# Patient Record
Sex: MALE | Race: WHITE | NOT HISPANIC OR LATINO | URBAN - METROPOLITAN AREA
[De-identification: names, ages, dates, MRNs, and addresses within clinical notes are randomized per-mention and may not be internally consistent; named-entity substitution may affect disease eponyms.]

---

## 2020-10-31 ENCOUNTER — EMERGENCY (EMERGENCY)
Facility: HOSPITAL | Age: 40
LOS: 0 days | Discharge: HOME | End: 2020-10-31
Attending: EMERGENCY MEDICINE
Payer: COMMERCIAL

## 2020-10-31 VITALS — WEIGHT: 220.02 LBS | HEIGHT: 72 IN

## 2020-10-31 VITALS
OXYGEN SATURATION: 99 % | RESPIRATION RATE: 18 BRPM | SYSTOLIC BLOOD PRESSURE: 122 MMHG | HEART RATE: 60 BPM | TEMPERATURE: 98 F | DIASTOLIC BLOOD PRESSURE: 61 MMHG

## 2020-10-31 DIAGNOSIS — S52.601B: ICD-10-CM
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
## 2020-10-31 DIAGNOSIS — Z23 ENCOUNTER FOR IMMUNIZATION: ICD-10-CM

## 2020-10-31 DIAGNOSIS — M25.531 PAIN IN RIGHT WRIST: ICD-10-CM
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
## 2020-10-31 PROCEDURE — 90471 IMMUNIZATION ADMIN: CPT

## 2020-10-31 PROCEDURE — 73090 X-RAY EXAM OF FOREARM: CPT | Mod: RT

## 2020-10-31 PROCEDURE — 90715 TDAP VACCINE 7 YRS/> IM: CPT

## 2020-10-31 PROCEDURE — 73110 X-RAY EXAM OF WRIST: CPT | Mod: 26,RT

## 2020-10-31 PROCEDURE — 29125 APPL SHORT ARM SPLINT STATIC: CPT | Mod: RT

## 2020-10-31 PROCEDURE — 96365 THER/PROPH/DIAG IV INF INIT: CPT | Mod: XU

## 2020-10-31 PROCEDURE — 73090 X-RAY EXAM OF FOREARM: CPT | Mod: 26,RT

## 2020-10-31 PROCEDURE — 99284 EMERGENCY DEPT VISIT MOD MDM: CPT | Mod: 25

## 2020-10-31 PROCEDURE — 99284 EMERGENCY DEPT VISIT MOD MDM: CPT

## 2020-10-31 PROCEDURE — 73100 X-RAY EXAM OF WRIST: CPT | Mod: 26,59,RT

## 2020-10-31 PROCEDURE — 73110 X-RAY EXAM OF WRIST: CPT | Mod: RT

## 2020-10-31 PROCEDURE — 96375 TX/PRO/DX INJ NEW DRUG ADDON: CPT | Mod: XU

## 2020-10-31 RX ORDER — CEPHALEXIN 500 MG
1 CAPSULE ORAL
Qty: 28 | Refills: 0
Start: 2020-10-31

## 2020-10-31 RX ORDER — TETANUS TOXOID, REDUCED DIPHTHERIA TOXOID AND ACELLULAR PERTUSSIS VACCINE, ADSORBED 5; 2.5; 8; 8; 2.5 [IU]/.5ML; [IU]/.5ML; UG/.5ML; UG/.5ML; UG/.5ML
0.5 SUSPENSION INTRAMUSCULAR ONCE
Refills: 0 | Status: COMPLETED | OUTPATIENT
Start: 2020-10-31 | End: 2020-10-31

## 2020-10-31 RX ORDER — MORPHINE SULFATE 50 MG/1
4 CAPSULE, EXTENDED RELEASE ORAL ONCE
Refills: 0 | Status: DISCONTINUED | OUTPATIENT
Start: 2020-10-31 | End: 2020-10-31

## 2020-10-31 RX ORDER — CEFAZOLIN SODIUM 1 G
2000 VIAL (EA) INJECTION ONCE
Refills: 0 | Status: COMPLETED | OUTPATIENT
Start: 2020-10-31 | End: 2020-10-31

## 2020-10-31 RX ADMIN — MORPHINE SULFATE 4 MILLIGRAM(S): 50 CAPSULE, EXTENDED RELEASE ORAL at 17:39

## 2020-10-31 RX ADMIN — TETANUS TOXOID, REDUCED DIPHTHERIA TOXOID AND ACELLULAR PERTUSSIS VACCINE, ADSORBED 0.5 MILLILITER(S): 5; 2.5; 8; 8; 2.5 SUSPENSION INTRAMUSCULAR at 16:52

## 2020-10-31 RX ADMIN — Medication 100 MILLIGRAM(S): at 16:52

## 2020-10-31 RX ADMIN — Medication 2000 MILLIGRAM(S): at 17:31

## 2020-10-31 RX ADMIN — MORPHINE SULFATE 4 MILLIGRAM(S): 50 CAPSULE, EXTENDED RELEASE ORAL at 18:05

## 2020-10-31 NOTE — ED STATDOCS - OBJECTIVE STATEMENT
41 y/o M with PMHx of anxiety presents to the ED c/o +RUE pain with +laceration s/p being kicked by his horse. No fever. Not on AC. Last Tetanus unknown. NKDA. PCP: Dr. Jackson.

## 2020-10-31 NOTE — ED STATDOCS - CARE PROVIDERS DIRECT ADDRESSES
,[email or web address] ,[email or web address]

## 2020-10-31 NOTE — ED STATDOCS - PROVIDER TOKENS
PROVIDER:[TOKEN:[[id number]],FOLLOWUP:[4-6 Days]] PROVIDER:[TOKEN:[[id number]],FOLLOWUP:[4-6 Days]]

## 2020-10-31 NOTE — ED ADULT TRIAGE NOTE - CHIEF COMPLAINT QUOTE
Pt. to the ED C/O Right Arm injury when got kicked by horses. Pt. denies injuries to chest, abdomen, head and pelvis- Small laceration to right arm noted. able to move arm- GSC 15- Denies blood thinners/aspirin- Hx. of Anxiety - Pt. does not meet criteria for TA at is time

## 2020-10-31 NOTE — ED STATDOCS - CARE PROVIDER_API CALL
Tyler Mccrary  ORTHOPAEDIC SURGERY  [address], [address]  [place] [address]  Phone: [phone]  Fax: [phone]  Follow Up Time: 4-6 Days   Michele Silva  ORTHOPAEDIC SURGERY  [address]  [place] [address]  Phone: [phone]  Fax: [phone]  Follow Up Time: 4-6 Days

## 2020-10-31 NOTE — ED STATDOCS - PHYSICAL EXAMINATION
PA NOTE: GEN: AOX3, NAD. HEENT: Throat clear. Airway is patent. EYES: PERRLA. EOMI. Head: NC/AT. NECK: Supple, No JVD. FROM. C-spine non-tender. CV:S1S2, RRR, LUNGS: Non-labored breathing, no tachypnea. O2sat 100% RA. CTA b/l. No w/r/r. CHEST: Equal chest expansion and rise. No deformity. ABD: Soft, NT/ND, no rebound, no guarding. No CVAT. EXT: No e/c/c. 2+ distal pulses. RUE: +Deformity right wrist. +Small puncture wound right wrist ulnar aspect, ?suspicious for open fracture. +Slight bleeding. NVI. 2+ distal pulses. +Tenderness. Painful ROM. Right hand exam WNL. NVI. SKIN: No rashes. NEURO: No focal deficits. CN II-XII intact. FROM. 5/5 motor and sensory.  ~Yair Teresa PA-C

## 2020-10-31 NOTE — ED ADULT NURSE NOTE - OBJECTIVE STATEMENT
Patient presents to the ED c/o +RUE pain with +laceration s/p being kicked by his horse. No fever. Not on AC. Last Tetanus unknown.

## 2020-10-31 NOTE — ED ADULT NURSE NOTE - NSIMPLEMENTINTERV_GEN_ALL_ED
Implemented All Universal Safety Interventions:  [place] to call system. Call bell, personal items and telephone within reach. Instruct patient to call for assistance. Room bathroom lighting operational. Non-slip footwear when patient is off stretcher. Physically safe environment: no spills, clutter or unnecessary equipment. Stretcher in lowest position, wheels locked, appropriate side rails in place.

## 2020-10-31 NOTE — ED STATDOCS - PATIENT PORTAL LINK FT
You can access the FollowMyHealth Patient Portal offered by [facility] by registering at the following website: [email or web address]. By joining [facility]’s FollowMyHealth portal, you will also be able to view your health information using other applications (apps) compatible with our system.

## 2020-10-31 NOTE — ED STATDOCS - PROGRESS NOTE DETAILS
PA: Patient is a 41 y/o male with PMHx of Anxiety who presents to [facility] c/o right wrist injury after getting kicked by a horse just PTA. Patient reports right wrist laceration in the area of injury. No other trauma. No head/neck injury. Last TDAP unknown. No fever. Not on anticoagulants. ~Yair Teresa PA-C   Patient seen and evaluated in . Will get xrays, IV Ancef, TDAP. Reassess. ~Yair Teresa PA-C PA: Xrays +devante for right wrist ulnar fracture with puncture wound to injured area, concerning for open fracture. Paged ORTHO Dr. Silva. ~Yair Teresa PA-C PA: Patient seen by Dr. Silva, fracture reduced by ORTHO team & Dr. Silva. Ortho ordered post reduction views. POSSIBLE Open fracture discussed with Dr. Silva who does not think patient needs to go to OR, will see patient in office next week, ok to [place] home. ~SIMÓN RothmanC PA note: Patient seen by Dr. Levy, s/o closed reduction, received Ancef 2gm, Morphine for pain. Wound care performed by ORTHO team. All x-ray studies reviewed in details with patient. Patient re-examined and re-evaluated. Patient feels much better at this time. ED evaluation, Diagnosis and management discussed with the patient in detail. Workup results discussed with ED attending, OK to dc home. Close ORTHO DR. LEVY follow up encouraged.  Strict ED return instructions discussed in detail and patient given the opportunity to ask any questions about their discharge diagnosis and instructions. Patient verbalized understanding. ~ Yair Teresa PA-C

## 2020-10-31 NOTE — ED STATDOCS - NS_ ATTENDINGSCRIBEDETAILS _ED_A_ED_FT
I, Cleve Zhou MD,  performed the initial face to face bedside interview with this patient regarding history of present illness, review of symptoms and relevant past medical, social and family history.  I completed an independent physical examination.    The history, relevant review of systems, past medical and surgical history, medical decision making, and physical examination was documented by the scribe in my presence and I attest to the accuracy of the documentation.

## 2020-10-31 NOTE — ED STATDOCS - NSFOLLOWUPINSTRUCTIONS_ED_ALL_ED_FT
Ulnar Fracture       An ulnar fracture is a break in the ulnar bone. The ulna is a bone in the forearm, on the same side as the little finger. The forearm is the part of the arm that is between the elbow and the wrist. It is made up of two bones: the radius and the ulna. You can feel the ulna on the outside of the wrist and at the point of the elbow.    An ulnar fracture can happen near the wrist, near the elbow, or in the middle of the forearm. In many cases of ulnar fracture, the radius is also fractured.      What are the causes?  This condition is usually caused by a direct hit or stress to the forearm. This may result from:  •An accident, such as a car or bike accident.      •Falling with the arm outstretched.        What increases the risk?  You may be more likely to fracture your ulna if you:  •Play contact sports.      •Have a condition that causes your bones to become thin and brittle (osteoporosis).        What are the signs or symptoms?  Signs and symptoms may include:  •Pain immediately after the injury.      •An abnormal bend or bump in the arm (deformity).      •Swelling.      •Bruising.      •Numbness or weakness in your hand.      •Inability to turn your hand from side to side.        How is this diagnosed?  This condition may be diagnosed based on:  •Your symptoms and medical history.      •A physical exam.      •An X-ray.        How is this treated?  Treatment depends on how severe your fracture is, where it is, and how the pieces of the broken bone line up with each other (alignment).  •The first step in treatment may be for you to wear a temporary splint for a few days. After the swelling goes down, you may get a cast, get a different type of splint, or have surgery.      •If your broken bone is in good alignment, you will need to wear a splint or cast for several weeks.    •If your broken bone is not aligned (is displaced), your health care provider will need to align the bone pieces. After alignment, you will need to wear a splint or cast for up to 6 weeks. To align your broken bone, your health care provider may:  •Move the bones back into position without surgery (closed reduction).      •Perform surgery to align the fracture and fix the bone pieces into place with metal screws, plates, or wires (open reduction and internal fixation, ORIF).      •Perform surgery to align the fracture and fix the bone pieces into place with pins that are attached to a stabilizing bar outside your skin (external fixation).      Treatment may also include:  •Having your cast changed after 2–3 weeks.      •Physical therapy.      •Follow-up visits and X-rays to make sure you are healing.        Follow these instructions at home:    If you have a splint:     •Wear it as told by your health care provider. Remove it only as told by your health care provider.      •Loosen the splint if your fingers tingle, become numb, or turn cold and blue.      •Keep the splint clean and dry.      If you have a cast:     • Do not stick anything inside the cast to scratch your skin. Doing that increases your risk for infection.      •Check the skin around the cast every day. Tell your health care provider about any concerns.      •You may put lotion on dry skin around the edges of the cast. Do not put lotion on the skin underneath the cast.      •Keep the cast clean and dry.      Bathing     • Do not take baths, swim, or use a hot tub until your health care provider approves. Ask your health care provider if you may take showers. You may only be allowed to take sponge baths.    •If your splint or cast is not waterproof:  •Do not let it get wet.      •Cover it with a watertight covering when you take a bath or a shower.        Activity     • Do not lift anything with your injured arm.      • Do not use the injured arm to support your body weight until your health care provider says that you can.      •Ask your health care provider what activities are safe for you during recovery, and ask what activities you need to avoid.        Managing pain, stiffness, and swelling    •If directed, put ice on painful areas:  •If you have a removable splint, remove it as told by your health care provider.      •Put ice in a plastic bag.      •Place a towel between your skin and the bag, or between your cast and the bag.      •Leave the ice on for 20 minutes, 2–3 times a day.        •Move your fingers often to avoid stiffness and to lessen swelling.      •Raise (elevate) the injured area above the level of your heart while you are sitting or lying down.      General instructions     • Do not put pressure on any part of the cast or splint until it is fully hardened. This may take several hours.      •Take over-the-counter and prescription medicines only as told by your health care provider.      • Do not drive until your health care provider approves. You should not drive or use heavy machinery while taking prescription pain medicine.      • Do not use any products that contain nicotine or tobacco, such as cigarettes and e-cigarettes. These can delay bone healing. If you need help quitting, ask your health care provider.       •Keep all follow-up visits as told by your health care provider. This is important.        Contact a health care provider if you have:    •Pain that does not get better with medicine.      •Swelling that gets worse.      •A bad smell coming from your cast.        Get help right away if:    •You cannot move your fingers.      •You have severe pain.    •Your fingers or your hand:  •Become numb, cold, or pale.      •Turn a bluish color.          Summary    •An ulnar fracture is a break in the ulnar bone, which is the bone in your forearm that is on the same side as your little finger.      •You may need to wear a splint or a cast for at least several weeks. Sometimes surgery is needed.      •Keep all follow-up visits as told by your health care provider.      This information is not intended to replace advice given to you by your health care provider. Make sure you discuss any questions you have with your health care provider.

## 2020-10-31 NOTE — CONSULT NOTE ADULT - SUBJECTIVE AND OBJECTIVE BOX
40yMale presents to  ED c/o severe R wrist pain s/p mechanical fall. Pt states he was kicked by his horse in his right wrist this afternoon. Patient denies head hit or LOC. Localizing pain to distal radius. Denies radiation of pain. Pt denies numbness, tingling or burning. R Hand Dominant. Patient denies any other injuries.    PMH: denies    AH: denies      T(C): 36.7 (10-31-20 @ 18:45)  HR: 60 (10-31-20 @ 18:45)  BP: 122/61 (10-31-20 @ 18:45)  RR: 18 (10-31-20 @ 18:45)  SpO2: 99% (10-31-20 @ 18:45)  Wt(kg): --    PE R UE:  less than 1 cm poke hole at lateral distal ulnar border, no visible deformity of wrist, + soft tissue swelling, no ecchymosis; Decreased ROM of Wrist 2/2 pain. Normal Elbow/Shoulder ROM w/o pain. + TTP over Ulna. + Rad Pulse 2+/4. SILT C5-T1, +AIN/PIN/Ulnar/Radial/Musc/Median, soft compartments;    Secondary Exam: Benign, Skin intact, NTTP along axial spine, SILT throughout, motor grossly intact throughout, no other orthopedic injuries at this time, compartments soft and compressible      Imaging:  XRay R Wrist  3 views of R Wrist demonstrates R distal 3rd ulna fracture, transverse, extra-articular w/o displacement of carpus/hand in relation to forearm. No other fx/dislocations noted.         Procedure Note:   Poke hole irrigated and covered with sterile gauze. Sugartong splint applied to Forearm/Wrist and mold held. NV XR obtained which show improved alignment. Pt NVI post procedure. Pt tolerated procedure well.    A/P: 40yMale s/p Mech Fall w/ open  R Distal 3rd ulna Fracture    - Pain control  - abx-Keflex 500mg qid for 10 days  - Strict Ice/Elevation  - NWB R UE with splint and sling  - Keep splint clean/dry/intact;  - Encourage active finger motion to help with swelling  - Pt aware of possible need for surgical intervention of distal radius fracture. Will FU as outpatient with Dr. Silva Next week  - Pt made aware of signs and symptoms of compartment syndrome. Aware of need to contact Doctor / Return to ED if symptoms arise.  - All Pt's / Family Members questions answered, Pt/family understand plan.  - ELIZABETH lim/ Dr. Silva in 2-3 days.

## 2020-12-11 PROBLEM — F41.9 ANXIETY DISORDER, UNSPECIFIED: Chronic | Status: ACTIVE | Noted: 2020-10-31

## 2020-12-15 PROBLEM — Z00.00 ENCOUNTER FOR PREVENTIVE HEALTH EXAMINATION: Status: ACTIVE | Noted: 2020-12-15

## 2020-12-16 ENCOUNTER — APPOINTMENT (OUTPATIENT)
Dept: ORTHOPEDIC SURGERY | Facility: CLINIC | Age: 40
End: 2020-12-16
Payer: COMMERCIAL

## 2020-12-16 VITALS — BODY MASS INDEX: 30.48 KG/M2 | HEIGHT: 72 IN | WEIGHT: 225 LBS

## 2020-12-16 DIAGNOSIS — Z78.9 OTHER SPECIFIED HEALTH STATUS: ICD-10-CM

## 2020-12-16 DIAGNOSIS — Z80.9 FAMILY HISTORY OF MALIGNANT NEOPLASM, UNSPECIFIED: ICD-10-CM

## 2020-12-16 DIAGNOSIS — G40.909 EPILEPSY, UNSPECIFIED, NOT INTRACTABLE, W/OUT STATUS EPILEPTICUS: ICD-10-CM

## 2020-12-16 PROCEDURE — 73110 X-RAY EXAM OF WRIST: CPT | Mod: RT

## 2020-12-16 PROCEDURE — 99072 ADDL SUPL MATRL&STAF TM PHE: CPT

## 2020-12-16 PROCEDURE — 99203 OFFICE O/P NEW LOW 30 MIN: CPT

## 2020-12-16 RX ORDER — ESCITALOPRAM OXALATE 20 MG/1
20 TABLET ORAL
Refills: 0 | Status: ACTIVE | COMMUNITY

## 2020-12-16 RX ORDER — VALACYCLOVIR 500 MG/1
TABLET, FILM COATED ORAL
Refills: 0 | Status: ACTIVE | COMMUNITY

## 2020-12-17 ENCOUNTER — OUTPATIENT (OUTPATIENT)
Dept: OUTPATIENT SERVICES | Facility: HOSPITAL | Age: 40
LOS: 1 days | End: 2020-12-17
Payer: COMMERCIAL

## 2020-12-17 VITALS
SYSTOLIC BLOOD PRESSURE: 123 MMHG | DIASTOLIC BLOOD PRESSURE: 81 MMHG | WEIGHT: 225.09 LBS | TEMPERATURE: 98 F | HEIGHT: 72 IN | HEART RATE: 62 BPM | OXYGEN SATURATION: 97 % | RESPIRATION RATE: 14 BRPM

## 2020-12-17 DIAGNOSIS — Z98.890 OTHER SPECIFIED POSTPROCEDURAL STATES: Chronic | ICD-10-CM

## 2020-12-17 DIAGNOSIS — S52.201A UNSPECIFIED FRACTURE OF SHAFT OF RIGHT ULNA, INITIAL ENCOUNTER FOR CLOSED FRACTURE: ICD-10-CM

## 2020-12-17 DIAGNOSIS — Z01.818 ENCOUNTER FOR OTHER PREPROCEDURAL EXAMINATION: ICD-10-CM

## 2020-12-17 NOTE — H&P PST ADULT - ADDITIONAL PE
physical exam today at bedside only  history taken at another date by another provider  Monica, NP-C

## 2020-12-17 NOTE — H&P PST ADULT - NSICDXPASTMEDICALHX_GEN_ALL_CORE_FT
PAST MEDICAL HISTORY:  Anxiety     Fracture right distal ulna and radius    H/O herpes zoster      PAST MEDICAL HISTORY:  Anxiety     Fracture right distal ulna and radius    H/O herpes zoster     History of seizures 15 years ago on time episode

## 2020-12-17 NOTE — H&P PST ADULT - ASSESSMENT
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

## 2020-12-17 NOTE — H&P PST ADULT - HISTORY OF PRESENT ILLNESS
39 y/o male with h/o injury to right distal radius , has soft cast placed and removed with non union of bones scheduled for ORIF

## 2020-12-17 NOTE — H&P PST ADULT - NSANTHOSAYNRD_GEN_A_CORE
No. HALI screening performed.  STOP BANG Legend: 0-2 = LOW Risk; 3-4 = INTERMEDIATE Risk; 5-8 = HIGH Risk

## 2020-12-19 DIAGNOSIS — Z20.828 CONTACT WITH AND (SUSPECTED) EXPOSURE TO OTHER VIRAL COMMUNICABLE DISEASES: ICD-10-CM

## 2020-12-19 PROCEDURE — G0463: CPT

## 2020-12-20 ENCOUNTER — OUTPATIENT (OUTPATIENT)
Dept: OUTPATIENT SERVICES | Facility: HOSPITAL | Age: 40
LOS: 1 days | End: 2020-12-20
Payer: COMMERCIAL

## 2020-12-20 DIAGNOSIS — Z98.890 OTHER SPECIFIED POSTPROCEDURAL STATES: Chronic | ICD-10-CM

## 2020-12-20 DIAGNOSIS — Z20.828 CONTACT WITH AND (SUSPECTED) EXPOSURE TO OTHER VIRAL COMMUNICABLE DISEASES: ICD-10-CM

## 2020-12-20 LAB — SARS-COV-2 RNA SPEC QL NAA+PROBE: SIGNIFICANT CHANGE UP

## 2020-12-20 PROCEDURE — U0003: CPT

## 2020-12-21 ENCOUNTER — TRANSCRIPTION ENCOUNTER (OUTPATIENT)
Age: 40
End: 2020-12-21

## 2020-12-22 ENCOUNTER — APPOINTMENT (OUTPATIENT)
Dept: ORTHOPEDIC SURGERY | Facility: HOSPITAL | Age: 40
End: 2020-12-22

## 2020-12-22 ENCOUNTER — OUTPATIENT (OUTPATIENT)
Dept: OUTPATIENT SERVICES | Facility: HOSPITAL | Age: 40
LOS: 1 days | End: 2020-12-22
Payer: COMMERCIAL

## 2020-12-22 ENCOUNTER — RESULT REVIEW (OUTPATIENT)
Age: 40
End: 2020-12-22

## 2020-12-22 VITALS
HEART RATE: 66 BPM | DIASTOLIC BLOOD PRESSURE: 60 MMHG | OXYGEN SATURATION: 98 % | RESPIRATION RATE: 16 BRPM | SYSTOLIC BLOOD PRESSURE: 106 MMHG

## 2020-12-22 VITALS
RESPIRATION RATE: 14 BRPM | TEMPERATURE: 98 F | HEIGHT: 69 IN | HEART RATE: 62 BPM | DIASTOLIC BLOOD PRESSURE: 81 MMHG | SYSTOLIC BLOOD PRESSURE: 123 MMHG | OXYGEN SATURATION: 97 % | WEIGHT: 220.9 LBS

## 2020-12-22 DIAGNOSIS — S52.201A UNSPECIFIED FRACTURE OF SHAFT OF RIGHT ULNA, INITIAL ENCOUNTER FOR CLOSED FRACTURE: ICD-10-CM

## 2020-12-22 DIAGNOSIS — Z98.890 OTHER SPECIFIED POSTPROCEDURAL STATES: Chronic | ICD-10-CM

## 2020-12-22 PROCEDURE — C1889: CPT

## 2020-12-22 PROCEDURE — 25400 REPAIR RADIUS OR ULNA: CPT | Mod: RT

## 2020-12-22 PROCEDURE — 25405 REPAIR/GRAFT RADIUS OR ULNA: CPT | Mod: RT

## 2020-12-22 PROCEDURE — C1713: CPT

## 2020-12-22 PROCEDURE — 76000 FLUOROSCOPY <1 HR PHYS/QHP: CPT

## 2020-12-22 RX ORDER — ACETAMINOPHEN 500 MG
1000 TABLET ORAL ONCE
Refills: 0 | Status: COMPLETED | OUTPATIENT
Start: 2020-12-22 | End: 2020-12-22

## 2020-12-22 RX ORDER — VALACYCLOVIR 500 MG/1
0 TABLET, FILM COATED ORAL
Qty: 0 | Refills: 2 | DISCHARGE

## 2020-12-22 RX ORDER — ESCITALOPRAM OXALATE 10 MG/1
0 TABLET, FILM COATED ORAL
Qty: 0 | Refills: 1 | DISCHARGE

## 2020-12-22 RX ORDER — CEFAZOLIN SODIUM 1 G
2000 VIAL (EA) INJECTION ONCE
Refills: 0 | Status: DISCONTINUED | OUTPATIENT
Start: 2020-12-22 | End: 2020-12-22

## 2020-12-22 RX ORDER — ONDANSETRON 8 MG/1
4 TABLET, FILM COATED ORAL ONCE
Refills: 0 | Status: DISCONTINUED | OUTPATIENT
Start: 2020-12-22 | End: 2020-12-22

## 2020-12-22 RX ORDER — SODIUM CHLORIDE 9 MG/ML
1000 INJECTION, SOLUTION INTRAVENOUS
Refills: 0 | Status: DISCONTINUED | OUTPATIENT
Start: 2020-12-22 | End: 2020-12-22

## 2020-12-22 RX ORDER — HYDROMORPHONE HYDROCHLORIDE 2 MG/ML
0.5 INJECTION INTRAMUSCULAR; INTRAVENOUS; SUBCUTANEOUS
Refills: 0 | Status: DISCONTINUED | OUTPATIENT
Start: 2020-12-22 | End: 2020-12-22

## 2020-12-22 RX ORDER — OXYCODONE HYDROCHLORIDE 5 MG/1
1 TABLET ORAL
Qty: 10 | Refills: 0
Start: 2020-12-22

## 2020-12-22 RX ORDER — CHLORHEXIDINE GLUCONATE 213 G/1000ML
1 SOLUTION TOPICAL ONCE
Refills: 0 | Status: COMPLETED | OUTPATIENT
Start: 2020-12-22 | End: 2020-12-22

## 2020-12-22 RX ORDER — APREPITANT 80 MG/1
40 CAPSULE ORAL ONCE
Refills: 0 | Status: COMPLETED | OUTPATIENT
Start: 2020-12-22 | End: 2020-12-22

## 2020-12-22 RX ADMIN — Medication 1000 MILLIGRAM(S): at 13:00

## 2020-12-22 RX ADMIN — CHLORHEXIDINE GLUCONATE 1 APPLICATION(S): 213 SOLUTION TOPICAL at 08:41

## 2020-12-22 RX ADMIN — SODIUM CHLORIDE 75 MILLILITER(S): 9 INJECTION, SOLUTION INTRAVENOUS at 12:37

## 2020-12-22 RX ADMIN — APREPITANT 40 MILLIGRAM(S): 80 CAPSULE ORAL at 08:41

## 2020-12-22 RX ADMIN — Medication 400 MILLIGRAM(S): at 12:37

## 2020-12-22 NOTE — PROGRESS NOTE ADULT - ASSESSMENT
41 yo male is scheduled for ORIF right distal umnar possible distal radius fracture today at [facility]

## 2020-12-22 NOTE — ASU DISCHARGE PLAN (ADULT/PEDIATRIC) - "IF YOU OR YOUR GUARDIAN/FAMILY IS A SMOKER, IT IS IMPORTANT FOR YOUR HEALTH TO STOP SMOKING. PLEASE BE AWARE THAT SECOND HAND SMOKE IS ALSO HARMFUL."
Problem: Physical Therapy Goal  Goal: Physical Therapy Goal  Goals to be met by:      Patient will increase functional independence with mobility by performin. Supine to sit with Modified [place] -not met  2. Sit to stand transfer with [place] -not met  3. Bed to chair transfer with independence using no AD -not met  4. Gait  x150 feet with Supervision using LRAD. -not met  5. Lower extremity exercise program x20 reps per handout, with independence -not met       Goals remain appropriate. Nataliya Rushing, PT 2018           Statement Selected

## 2020-12-22 NOTE — PROGRESS NOTE ADULT - SUBJECTIVE AND OBJECTIVE BOX
physical exam today at bedside for surgery  right ulnar fracture with pain and swelling   vital signs stable  last solids 12/21/2020  NKDA

## 2020-12-22 NOTE — ASU DISCHARGE PLAN (ADULT/PEDIATRIC) - CALL YOUR DOCTOR IF YOU HAVE ANY OF THE FOLLOWING:
Pain not relieved by Medications/Fever greater than (need to indicate Fahrenheit or Celsius) Bleeding that does not stop/Swelling that gets worse/Pain not relieved by Medications/Fever greater than (need to indicate Fahrenheit or Celsius)/Wound/Surgical Site with redness, or foul smelling discharge or pus/Numbness, tingling, color or temperature change to extremity

## 2020-12-22 NOTE — BRIEF OPERATIVE NOTE - NSICDXBRIEFPROCEDURE_GEN_ALL_CORE_FT
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

## 2020-12-24 ENCOUNTER — APPOINTMENT (OUTPATIENT)
Dept: ORTHOPEDIC SURGERY | Facility: CLINIC | Age: 40
End: 2020-12-24
Payer: COMMERCIAL

## 2020-12-24 VITALS — TEMPERATURE: 97.6 F

## 2020-12-24 PROBLEM — Z86.19 PERSONAL HISTORY OF OTHER INFECTIOUS AND PARASITIC DISEASES: Chronic | Status: ACTIVE | Noted: 2020-12-17

## 2020-12-24 PROBLEM — Z87.898 PERSONAL HISTORY OF OTHER SPECIFIED CONDITIONS: Chronic | Status: ACTIVE | Noted: 2020-12-17

## 2020-12-24 PROBLEM — T14.8XXA OTHER INJURY OF UNSPECIFIED BODY REGION, INITIAL ENCOUNTER: Chronic | Status: ACTIVE | Noted: 2020-12-17

## 2020-12-24 PROCEDURE — 73110 X-RAY EXAM OF WRIST: CPT | Mod: RT

## 2020-12-24 PROCEDURE — 29075 APPL CST ELBW FNGR SHORT ARM: CPT | Mod: 58,RT

## 2020-12-24 PROCEDURE — 99024 POSTOP FOLLOW-UP VISIT: CPT

## 2020-12-25 ENCOUNTER — TRANSCRIPTION ENCOUNTER (OUTPATIENT)
Age: 40
End: 2020-12-25

## 2021-01-22 ENCOUNTER — APPOINTMENT (OUTPATIENT)
Dept: ORTHOPEDIC SURGERY | Facility: CLINIC | Age: 41
End: 2021-01-22
Payer: COMMERCIAL

## 2021-01-22 PROCEDURE — 99024 POSTOP FOLLOW-UP VISIT: CPT

## 2021-01-22 PROCEDURE — 73110 X-RAY EXAM OF WRIST: CPT | Mod: RT

## 2021-01-22 NOTE — END OF VISIT
[FreeTextEntry3] : This note was written by Trinidad George on 01/22/2021 acting solely as a scribe for Dr. Luis Kapoor.\par  \par All medical record entries made by the Scribe were at my, Dr. Luis Kapoor, direction and personally dictated by me on 01/22/2021. I have personally reviewed the chart and agree that the record accurately reflects my personal performance of the history, physical exam, assessment and plan.

## 2021-01-22 NOTE — ADDENDUM
[FreeTextEntry1] : I, Trinidad George, acted solely as a scribe for Dr. Kapoor on this date 01/22/2021.

## 2021-01-22 NOTE — DISCUSSION/SUMMARY
[FreeTextEntry1] : He has findings consistent with a 6-1/2-week old right distal ulna fracture that was reduced, and is now angulated and has not healed.\par \par I had a discussion regarding today's visit, the diagnosis, and treatment recommendations / options. At this time, based upon his x-rays, I told him that I would recommend ORIF.  I agree with the opinion the other hand surgeon that he saw.  He agreed to proceed and told me he would like to me to perform his surgery. He will be scheduled for surgery next Tuesday.  I have recommended ORIF of the fracture.  If necessary, he understands that he may require a bone graft.  Most often I use a synthetic graft but I did discuss the possible need for distal radius bone graft.\par \par -  The nature and purposes of the operation/procedure was discussed in detail.  I discussed the surgical procedure in detail, as well as expected postoperative recovery and outcome.\par -  Possible risks, benefits, and complications (from known and unknown causes) of the procedure were discussed in detail.  \par -  Possible non-operative alternatives to the proposed treatment were discussed in detail.  \par -  He was told that possible risks/complications include, but are not limited to:  Infection, nerve or vessel injury, possible need for removal of hardware in the future, possible loss of fixation of the hardware, stiffness, painful scar, poor outcome, need for additional surgical procedures, and other unforeseen complications.  \par -  In addition, the possibility of an ""unsuccessful outcome,"" despite ""successful surgery,"" was discussed with him.  Specifically, I discussed the potential for nonunion, and loss of fixation of the hardware, even with successful surgery.\par -  The patient fully understands these risks and wishes to proceed.  \par -  I had a lengthy discussion with the patient regarding today's visit, the diagnosis, and my surgical treatment recommendations.  The patient has agreed to this plan of management and has expressed full understanding.  All questions were fully answered to the patient's satisfaction. \par \par Over 50% of the time spent with the patient was on counseling the patient on the above diagnosis, treatment plan and prognosis.

## 2021-01-22 NOTE — HISTORY OF PRESENT ILLNESS
[de-identified] : 2 days postoperative. [de-identified] : 2 days status post ORIF of right distal one third ulna shaft fracture.\par \par He is doing well.  His pain is improving. [de-identified] : Examination of his right wrist after the splint was removed demonstrates his incision to be clean and dry.  There is swelling.  There is no drainage or evidence of infection.  He has some limitation of terminal flexion and extension of the digits.  He has a little finger PIP joint flexion contracture from a prior injury.  He has mild frank-incisional numbness and mild numbness along the dorsal ulnar sensory nerve branch. [de-identified] : PA, lateral oblique radiographs of his right wrist demonstrate good alignment of his fracture and hardware. [de-identified] : Stable, 2 days postoperative. [de-identified] : The suture ends were cut.  He was placed into a well-padded well molded right short arm fiberglass cast.  He was instructed on elevation, range of motion exercises to the digits and activity modification.  He will follow-up in 4 weeks for x-rays out of plaster.  He will follow-up before then if he is having any problems.\par \par I did tell him that the slight numbness is not unexpected, as the nerve was retracted during the surgery.  The ulnar nerve was fully dissected out and protected throughout the case, including the dorsal ulnar sensory nerve branch.

## 2021-01-22 NOTE — PHYSICAL EXAM
[de-identified] : - Constitutional: This is a male in no obvious distress.  \par - Psych: Patient is alert and oriented to person, place and time.  Patient has a normal mood and affect.\par - Cardiovascular: Normal pulses throughout the upper extremities.  No significant varicosities are noted in the upper extremities. \par - Neuro: Strength and sensation are intact throughout the upper extremities.  Patient has normal coordination.\par - Respiratory:  Patient exhibits no evidence of shortness of breath or difficulty breathing.\par - Skin: No rashes, lesions, or other abnormalities are noted in the upper extremities.\par \par ---\par  \par Examination of his right wrist and forearm after the cast was removed demonstrates swelling and tenderness along the distal one third of the ulna.  He has pain and limitation of flexion and extension more notable pronation supination with voluntary guarding.  He has a little finger PIP joint flexion contracture from a prior injury.  There are no focal neurologic deficits noted distally along the radial, ulnar or median nerve distributions. [de-identified] : PA, lateral, and oblique radiographs of the right wrist demonstrate a distal one third ulnar fracture to be visible.  The fracture has not healed.  There is angulation apex radial.

## 2021-01-22 NOTE — HISTORY OF PRESENT ILLNESS
[de-identified] : 31 days postoperative. [de-identified] : 31 days status post ORIF of right distal one third ulna shaft fracture.\par \par He is doing well. He denies any pain. He has complaints of some numbness in the little finger.  [de-identified] : Examination of his right wrist after the cast was removed demonstrates his incision to be healing well.  There is decreased swelling.  He has full flexion of the digits.  He does have limitation of extension at the little finger PIP joint from a prior injury.  He has slight numbness to light touch along the dorsal ulnar aspect of the hand but intact sensation to pinch as well as normal sensation palmarly along the median and ulnar nerve distributions. [de-identified] : PA, lateral oblique radiographs of his right wrist demonstrate good alignment of his fracture and hardware.  There is excellent healing of the fracture. [de-identified] : Stable, 31 days postoperative. [de-identified] : He was instructed on range of motion exercises to the wrist, protection with the splint and avoidance of lifting or grasping.  He was given a referral to occupational therapy.  He was also instructed on scar massage and desensitization.  He told me he is going to [place] for the month of February, so he will follow-up when he returns in March.  If there are any problems in the interim, he was instructed to call me.  Finally, with regard to the slight numbness along the dorsal ulnar aspect of the hand, this is secondary to retraction of the dorsal ulnar cutaneous nerve branch which was dissected out and protected throughout the procedure.  I told him that this should resolve with time.

## 2021-01-22 NOTE — END OF VISIT
[FreeTextEntry3] : This note was written by Trinidad George on 12/16/2020 acting solely as a scribe for Dr. Luis Kapoor.\par  \par All medical record entries made by the Scribe were at my, Dr. Luis Kapoor, direction and personally dictated by me on 12/16/2020. I have personally reviewed the chart and agree that the record accurately reflects my personal performance of the history, physical exam, assessment and plan.

## 2021-01-22 NOTE — ADDENDUM
[FreeTextEntry1] : I, Trinidad George, acted solely as a scribe for Dr. Kapoor on this date 12/16/2020.

## 2021-01-22 NOTE — HISTORY OF PRESENT ILLNESS
[Right] : right hand dominant [FreeTextEntry1] : He comes in today for evaluation of a right wrist fracture, which occurred 6.5 weeks ago. He went to [facility] at the time where x-rays of the right wrist demonstrated transverse fracture distal ulnar diaphysis. The fracture was reduced and he was fitted with a splint. He followed up with the ER physician, Dr. Silva, a few days later who converted him to a cast. Repeat x-rays demonstrated increasing displacement of his fracture from 10 to 20 degrees. He then saw another hand specialist, Dr. Talley, last week who told him that he needs surgery. He presents today for a second opinion.

## 2021-03-05 PROBLEM — S52.201A FRACTURE OF RIGHT ULNA: Status: ACTIVE | Noted: 2020-12-16

## 2021-03-12 ENCOUNTER — APPOINTMENT (OUTPATIENT)
Dept: ORTHOPEDIC SURGERY | Facility: CLINIC | Age: 41
End: 2021-03-12
Payer: SELF-PAY

## 2021-03-12 DIAGNOSIS — S52.201A UNSPECIFIED FRACTURE OF SHAFT OF RIGHT ULNA, INITIAL ENCOUNTER FOR CLOSED FRACTURE: ICD-10-CM

## 2021-03-12 PROCEDURE — 73110 X-RAY EXAM OF WRIST: CPT | Mod: RT

## 2021-03-12 PROCEDURE — 99024 POSTOP FOLLOW-UP VISIT: CPT

## 2021-03-12 NOTE — END OF VISIT
[FreeTextEntry3] : This note was written by Trinidad George on 03/12/2021 acting solely as a scribe for Dr. Luis Kapoor.\par  \par All medical record entries made by the Scribe were at my, Dr. Luis Kapoor, direction and personally dictated by me on 03/12/2021. I have personally reviewed the chart and agree that the record accurately reflects my personal performance of the history, physical exam, assessment and plan.

## 2021-03-12 NOTE — ADDENDUM
[FreeTextEntry1] : I, Trinidad George, acted solely as a scribe for Dr. Kapoor on this date 03/12/2021.

## 2021-03-12 NOTE — HISTORY OF PRESENT ILLNESS
[de-identified] : 2-1/2 months postoperative. 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  Date of surgery: 12/22/2020.\par \par He was away in [place] for the past month.\par \par He is doing well. His motion is improving. He is having some numbness along the dorsal aspect of the wrist. He has been wearing a wrist brace for comfort. [de-identified] : Examination of his right wrist demonstrates his incision to be healing well.  There is minimal residual swelling.  He has some residual numbness along the dorsal aspect of the incision but intact sensation to light touch along the dorsal ulnar sensory nerve branch within the hand.  He has full flexion and extension of the digits.  He has approximately 60 degrees of wrist flexion and extension at 75 degrees of pronation supination. [de-identified] : PA, lateral oblique radiographs of his right wrist demonstrate good alignment of his fracture and hardware.  The fracture is fully consolidated. [de-identified] : Stable, 2-1/2 months postoperative. [de-identified] : He was instructed on continue range of motion exercises and scar massage and desensitization. He was given a referral to hand therapy.  He will advance his activities, according to his symptoms.  He will follow-up in 6 weeks as needed, according to his symptoms.

## 2021-06-01 ENCOUNTER — OUTPATIENT (OUTPATIENT)
Dept: OUTPATIENT SERVICES | Facility: HOSPITAL | Age: 41
LOS: 1 days | End: 2021-06-01
Payer: COMMERCIAL

## 2021-06-01 DIAGNOSIS — Z20.828 CONTACT WITH AND (SUSPECTED) EXPOSURE TO OTHER VIRAL COMMUNICABLE DISEASES: ICD-10-CM

## 2021-06-01 DIAGNOSIS — Z98.890 OTHER SPECIFIED POSTPROCEDURAL STATES: Chronic | ICD-10-CM

## 2021-06-01 LAB — SARS-COV-2 RNA SPEC QL NAA+PROBE: SIGNIFICANT CHANGE UP

## 2021-06-01 PROCEDURE — U0003: CPT

## 2021-06-01 PROCEDURE — U0005: CPT

## 2021-06-01 PROCEDURE — C9803: CPT

## 2021-06-02 DIAGNOSIS — Z20.828 CONTACT WITH AND (SUSPECTED) EXPOSURE TO OTHER VIRAL COMMUNICABLE DISEASES: ICD-10-CM

## 2022-05-01 NOTE — ASU DISCHARGE PLAN (ADULT/PEDIATRIC) - ACTIVITY LEVEL
Pt examined at bedside requesting pain medication. No weight bearing/Elevate extremity No excercise/No heavy lifting/No weight bearing/Elevate extremity

## 2023-12-26 NOTE — H&P PST ADULT - MEDICATION HERBAL REMEDIES, PROFILE
"[facility]  Progress Note  Name: Neha Molina I  MRN: [id number]  Unit/Bed#: [facility] 617-01 I Date of Admission: 12/21/2023   Date of Service: 12/26/2023 I Hospital Day: 5    Assessment/Plan   Fracture of ramus of right pubis (HCC)  Assessment & Plan  -Comminuted displaced fracture of the right inferior and superior pubic rami.   -See sacral fracture for management    Acute pain due to trauma  Assessment & Plan  -Multimodal pain control    Fall  Assessment & Plan  -Mechanical in nature  -PT/OT  -Geriatrics consult    Sacral fracture, closed (HCC)  Assessment & Plan  -Acute nondisplaced fracture involving the right sacral wing.  -Ortho consulted, appreciate recommendations  -Ortho recommends outpatient follow-up in 4 weeks   -PT/OT  -WBAT  -Multimodal pain control  -DVT prophylaxis    Other hyperlipidemia  Assessment & Plan  -Continue statin    Other specified hypothyroidism  Assessment & Plan  -Continue levothyroxine           Bowel Regimen:  Miralax  VTE Prophylaxis:Enoxaparin (Lovenox)     Disposition: rehab    Subjective   Chief Complaint: back soreness    Subjective: \" I hope to sit up today\"     Objective   Vitals:   Temp:  [97.3 °F (36.3 °C)-98.9 °F (37.2 °C)] 98.6 °F (37 °C)  HR:  [67-82] 72  Resp:  [16-18] 17  BP: (103-136)/(54-98) 127/60    I/O         12/24 0701  12/25 0700 12/25 0701  12/26 0700 12/26 0701  12/27 0700    P.O. 320 60     Total Intake 320 60     Urine 2150 650     Stool 0      Total Output 2150 650     Net -1830 -590            Unmeasured Urine Occurrence 0 x      Unmeasured Stool Occurrence 1 x               Physical Exam:   GENERAL APPEARANCE: comfortable  NEURO: GCS - 15  HEENT: EOm's intact  CV: RRR  LUNGS: CTA bilaterally  GI: tolerating a diet  : voiding  MSK: moving extremities  SKIN: warm and dry    Invasive Devices       Peripheral Intravenous Line  Duration             Peripheral IV 12/25/23 Right Antecubital 1 day              Drain  " Duration             External Urinary Catheter 4 days                          Lab Results:    Latest Reference Range & Units 12/26/23 06:41   Sodium 135 - 147 mmol/L 138   Potassium 3.5 - 5.3 mmol/L 4.5   Chloride 96 - 108 mmol/L 104   CO2 21 - 32 mmol/L 26   Anion Gap mmol/L 8   BUN 5 - 25 mg/dL 30 (H)   Creatinine 0.60 - 1.30 mg/dL 0.75   Glucose, Random 65 - 140 mg/dL 118   Calcium 8.4 - 10.2 mg/dL 9.4   eGFR ml/min/1.73sq m 69   WBC 4.31 - 10.16 Thousand/uL 7.91   Red Blood Cell Count 3.81 - 5.12 Million/uL 3.48 (L)   Hemoglobin 11.5 - 15.4 g/dL 11.2 (L)   HCT 34.8 - 46.1 % 33.2 (L)   MCV 82 - 98 fL 95   MCH 26.8 - 34.3 pg 32.2   MCHC 31.4 - 37.4 g/dL 33.7   RDW 11.6 - 15.1 % 13.2   Platelet Count 149 - 390 Thousands/uL 231   MPV 8.9 - 12.7 fL 9.2   nRBC /100 WBCs 0   Neutrophils % 43 - 75 % 65   (H): Data is abnormally high  (L): Data is abnormally low  Imaging: none   Other Studies: none        no